# Patient Record
Sex: FEMALE | Race: WHITE | NOT HISPANIC OR LATINO
[De-identification: names, ages, dates, MRNs, and addresses within clinical notes are randomized per-mention and may not be internally consistent; named-entity substitution may affect disease eponyms.]

---

## 2017-08-29 ENCOUNTER — APPOINTMENT (OUTPATIENT)
Dept: OBGYN | Facility: CLINIC | Age: 52
End: 2017-08-29
Payer: COMMERCIAL

## 2017-08-29 VITALS
WEIGHT: 194 LBS | BODY MASS INDEX: 32.32 KG/M2 | HEART RATE: 76 BPM | SYSTOLIC BLOOD PRESSURE: 153 MMHG | DIASTOLIC BLOOD PRESSURE: 89 MMHG | HEIGHT: 65 IN

## 2017-08-29 PROCEDURE — 99396 PREV VISIT EST AGE 40-64: CPT

## 2017-09-09 LAB
CYTOLOGY CVX/VAG DOC THIN PREP: NORMAL
HPV HIGH+LOW RISK DNA PNL CVX: NEGATIVE

## 2017-10-05 ENCOUNTER — RX RENEWAL (OUTPATIENT)
Age: 52
End: 2017-10-05

## 2017-10-06 ENCOUNTER — RX RENEWAL (OUTPATIENT)
Age: 52
End: 2017-10-06

## 2018-09-07 ENCOUNTER — APPOINTMENT (OUTPATIENT)
Dept: OBGYN | Facility: CLINIC | Age: 53
End: 2018-09-07
Payer: COMMERCIAL

## 2018-09-07 VITALS
BODY MASS INDEX: 28.82 KG/M2 | HEIGHT: 65 IN | SYSTOLIC BLOOD PRESSURE: 127 MMHG | DIASTOLIC BLOOD PRESSURE: 86 MMHG | HEART RATE: 52 BPM | WEIGHT: 173 LBS

## 2018-09-07 DIAGNOSIS — Z87.42 PERSONAL HISTORY OF OTHER DISEASES OF THE FEMALE GENITAL TRACT: ICD-10-CM

## 2018-09-07 DIAGNOSIS — N95.1 MENOPAUSAL AND FEMALE CLIMACTERIC STATES: ICD-10-CM

## 2018-09-07 PROCEDURE — 99396 PREV VISIT EST AGE 40-64: CPT

## 2018-09-21 LAB
CYTOLOGY CVX/VAG DOC THIN PREP: NORMAL
HPV HIGH+LOW RISK DNA PNL CVX: NOT DETECTED

## 2018-11-26 ENCOUNTER — MEDICATION RENEWAL (OUTPATIENT)
Age: 53
End: 2018-11-26

## 2018-11-26 ENCOUNTER — OTHER (OUTPATIENT)
Age: 53
End: 2018-11-26

## 2018-11-27 ENCOUNTER — OTHER (OUTPATIENT)
Age: 53
End: 2018-11-27

## 2019-04-26 ENCOUNTER — OUTPATIENT (OUTPATIENT)
Dept: OUTPATIENT SERVICES | Facility: HOSPITAL | Age: 54
LOS: 1 days | End: 2019-04-26
Payer: COMMERCIAL

## 2019-04-26 ENCOUNTER — APPOINTMENT (OUTPATIENT)
Dept: ULTRASOUND IMAGING | Facility: CLINIC | Age: 54
End: 2019-04-26
Payer: COMMERCIAL

## 2019-04-26 DIAGNOSIS — Z00.8 ENCOUNTER FOR OTHER GENERAL EXAMINATION: ICD-10-CM

## 2019-04-26 PROCEDURE — 76882 US LMTD JT/FCL EVL NVASC XTR: CPT

## 2019-04-26 PROCEDURE — 76882 US LMTD JT/FCL EVL NVASC XTR: CPT | Mod: 26,LT

## 2019-08-27 ENCOUNTER — TRANSCRIPTION ENCOUNTER (OUTPATIENT)
Age: 54
End: 2019-08-27

## 2019-10-22 ENCOUNTER — OTHER (OUTPATIENT)
Age: 54
End: 2019-10-22

## 2019-10-31 ENCOUNTER — APPOINTMENT (OUTPATIENT)
Dept: OBGYN | Facility: CLINIC | Age: 54
End: 2019-10-31
Payer: COMMERCIAL

## 2019-10-31 VITALS
HEART RATE: 65 BPM | HEIGHT: 65 IN | WEIGHT: 185 LBS | SYSTOLIC BLOOD PRESSURE: 124 MMHG | BODY MASS INDEX: 30.82 KG/M2 | DIASTOLIC BLOOD PRESSURE: 80 MMHG

## 2019-10-31 DIAGNOSIS — N95.1 MENOPAUSAL AND FEMALE CLIMACTERIC STATES: ICD-10-CM

## 2019-10-31 DIAGNOSIS — Z12.39 ENCOUNTER FOR OTHER SCREENING FOR MALIGNANT NEOPLASM OF BREAST: ICD-10-CM

## 2019-10-31 DIAGNOSIS — Z00.00 ENCOUNTER FOR GENERAL ADULT MEDICAL EXAMINATION W/OUT ABNORMAL FINDINGS: ICD-10-CM

## 2019-10-31 DIAGNOSIS — Z87.898 PERSONAL HISTORY OF OTHER SPECIFIED CONDITIONS: ICD-10-CM

## 2019-10-31 PROCEDURE — 99396 PREV VISIT EST AGE 40-64: CPT

## 2019-10-31 NOTE — HISTORY OF PRESENT ILLNESS
[1 Year Ago] : 1 year ago [Good] : being in good health [Last Mammogram ___] : Last Mammogram was [unfilled] [Last Colonoscopy ___] : Last colonoscopy [unfilled] [Last Pap ___] : Last cervical pap smear was [unfilled] [Perimenopausal] : is perimenopausal [Sexually Active] : is sexually active [Monogamous] : is monogamous [Male ___] : [unfilled] male [NA] : N/A

## 2019-11-08 LAB — HPV HIGH+LOW RISK DNA PNL CVX: NOT DETECTED

## 2019-11-30 PROBLEM — N95.1 MENOPAUSAL SYMPTOMS: Status: RESOLVED | Noted: 2018-09-07 | Resolved: 2019-11-30

## 2019-11-30 NOTE — PHYSICAL EXAM
[Awake] : awake [Alert] : alert [Soft] : soft [Oriented x3] : oriented to person, place, and time [No Lesions] : no genitalia lesions [Normal] : cervix [Pap Obtained] : a Pap smear was performed [Anteversion] : anteverted [Uterine Adnexae] : were not tender and not enlarged [No Tenderness] : no rectal tenderness [Nl Sphincter Tone] : normal sphincter tone [Acute Distress] : no acute distress [LAD] : no lymphadenopathy [Thyroid Nodule] : no thyroid nodule [Goiter] : no goiter [Mass] : no breast mass [Nipple Discharge] : no nipple discharge [Axillary LAD] : no axillary lymphadenopathy [Tender] : non tender [Distended] : not distended [Depressed Mood] : not depressed [Occult Blood] : occult blood test from digital rectal exam was negative [FreeTextEntry4] : slight atrophy [Tenderness] : nontender [FreeTextEntry9] : no masses, no nodules

## 2019-12-05 ENCOUNTER — TRANSCRIPTION ENCOUNTER (OUTPATIENT)
Age: 54
End: 2019-12-05

## 2020-04-01 ENCOUNTER — RX RENEWAL (OUTPATIENT)
Age: 55
End: 2020-04-01

## 2021-05-28 ENCOUNTER — APPOINTMENT (OUTPATIENT)
Dept: OBGYN | Facility: CLINIC | Age: 56
End: 2021-05-28
Payer: COMMERCIAL

## 2021-05-28 VITALS
WEIGHT: 184 LBS | HEART RATE: 77 BPM | DIASTOLIC BLOOD PRESSURE: 82 MMHG | HEIGHT: 65 IN | BODY MASS INDEX: 30.66 KG/M2 | SYSTOLIC BLOOD PRESSURE: 135 MMHG

## 2021-05-28 DIAGNOSIS — Z78.0 ASYMPTOMATIC MENOPAUSAL STATE: ICD-10-CM

## 2021-05-28 PROCEDURE — 99072 ADDL SUPL MATRL&STAF TM PHE: CPT

## 2021-05-28 PROCEDURE — 99396 PREV VISIT EST AGE 40-64: CPT

## 2021-09-20 ENCOUNTER — APPOINTMENT (OUTPATIENT)
Dept: OTOLARYNGOLOGY | Facility: CLINIC | Age: 56
End: 2021-09-20
Payer: COMMERCIAL

## 2021-09-20 VITALS
HEIGHT: 65 IN | TEMPERATURE: 97.4 F | BODY MASS INDEX: 29.99 KG/M2 | HEART RATE: 71 BPM | WEIGHT: 180 LBS | DIASTOLIC BLOOD PRESSURE: 85 MMHG | SYSTOLIC BLOOD PRESSURE: 145 MMHG

## 2021-09-20 DIAGNOSIS — M26.609 UNSPECIFIED TEMPOROMANDIBULAR JOINT DISORDER: ICD-10-CM

## 2021-09-20 DIAGNOSIS — D16.4 BENIGN NEOPLASM OF BONES OF SKULL AND FACE: ICD-10-CM

## 2021-09-20 DIAGNOSIS — K13.70 UNSPECIFIED LESIONS OF ORAL MUCOSA: ICD-10-CM

## 2021-09-20 DIAGNOSIS — H92.02 OTALGIA, LEFT EAR: ICD-10-CM

## 2021-09-20 PROCEDURE — 99203 OFFICE O/P NEW LOW 30 MIN: CPT

## 2021-09-20 RX ORDER — AZELASTINE HYDROCHLORIDE AND FLUTICASONE PROPIONATE 137; 50 UG/1; UG/1
137-50 SPRAY, METERED NASAL
Refills: 0 | Status: ACTIVE | COMMUNITY

## 2021-09-20 NOTE — PHYSICAL EXAM
[Normal] : no abnormal secretions [de-identified] : left ear canal small osteomas [de-identified] : upper lip mucosa mild erythema

## 2021-09-20 NOTE — ASSESSMENT
[FreeTextEntry1] : preauricular pain consistent w tmj\par mild as canal stenosis\par tmj syndrome rec soft diet avoid chewing, advil bid\par return to dentist for adjustment of dental retainer\par mucosal irritation-triamcinalone in orabase

## 2021-09-20 NOTE — HISTORY OF PRESENT ILLNESS
[de-identified] : left ear pain 8 mo ago\par ear exam reported neg\par pain w mastication\par dental exam dx w tmj  has  but still w ear pain\par co inside of lips irritation

## 2022-01-19 ENCOUNTER — APPOINTMENT (OUTPATIENT)
Dept: GASTROENTEROLOGY | Facility: CLINIC | Age: 57
End: 2022-01-19
Payer: COMMERCIAL

## 2022-01-19 VITALS
HEIGHT: 65 IN | WEIGHT: 189 LBS | BODY MASS INDEX: 31.49 KG/M2 | HEART RATE: 86 BPM | OXYGEN SATURATION: 97 % | TEMPERATURE: 97.3 F | SYSTOLIC BLOOD PRESSURE: 135 MMHG | DIASTOLIC BLOOD PRESSURE: 81 MMHG

## 2022-01-19 DIAGNOSIS — Z12.11 ENCOUNTER FOR SCREENING FOR MALIGNANT NEOPLASM OF COLON: ICD-10-CM

## 2022-01-19 PROCEDURE — 99205 OFFICE O/P NEW HI 60 MIN: CPT

## 2022-03-25 ENCOUNTER — NON-APPOINTMENT (OUTPATIENT)
Age: 57
End: 2022-03-25

## 2022-03-29 ENCOUNTER — APPOINTMENT (OUTPATIENT)
Dept: GASTROENTEROLOGY | Facility: HOSPITAL | Age: 57
End: 2022-03-29

## 2022-03-29 ENCOUNTER — RESULT REVIEW (OUTPATIENT)
Age: 57
End: 2022-03-29

## 2022-03-29 ENCOUNTER — OUTPATIENT (OUTPATIENT)
Dept: OUTPATIENT SERVICES | Facility: HOSPITAL | Age: 57
LOS: 1 days | Discharge: HOME | End: 2022-03-29
Payer: COMMERCIAL

## 2022-03-29 VITALS
RESPIRATION RATE: 12 BRPM | DIASTOLIC BLOOD PRESSURE: 73 MMHG | HEART RATE: 87 BPM | HEIGHT: 65 IN | TEMPERATURE: 98 F | WEIGHT: 182.98 LBS | SYSTOLIC BLOOD PRESSURE: 103 MMHG | OXYGEN SATURATION: 98 %

## 2022-03-29 VITALS
OXYGEN SATURATION: 97 % | HEART RATE: 76 BPM | SYSTOLIC BLOOD PRESSURE: 143 MMHG | DIASTOLIC BLOOD PRESSURE: 81 MMHG | RESPIRATION RATE: 18 BRPM

## 2022-03-29 DIAGNOSIS — K29.31: ICD-10-CM

## 2022-03-29 DIAGNOSIS — Z12.11 ENCOUNTER FOR SCREENING FOR MALIGNANT NEOPLASM OF COLON: ICD-10-CM

## 2022-03-29 PROCEDURE — 45378 DIAGNOSTIC COLONOSCOPY: CPT

## 2022-03-29 PROCEDURE — 88305 TISSUE EXAM BY PATHOLOGIST: CPT | Mod: 26

## 2022-03-29 PROCEDURE — 43239 EGD BIOPSY SINGLE/MULTIPLE: CPT

## 2022-03-29 RX ORDER — SODIUM PICOSULFATE, MAGNESIUM OXIDE, AND ANHYDROUS CITRIC ACID 10; 3.5; 12 MG/160ML; G/160ML; G/160ML
10-3.5-12 MG-GM LIQUID ORAL
Qty: 1 | Refills: 0 | Status: COMPLETED | COMMUNITY
Start: 2022-01-19 | End: 2022-03-29

## 2022-04-01 LAB — SURGICAL PATHOLOGY STUDY: SIGNIFICANT CHANGE UP

## 2022-05-02 ENCOUNTER — APPOINTMENT (OUTPATIENT)
Dept: GASTROENTEROLOGY | Facility: CLINIC | Age: 57
End: 2022-05-02
Payer: COMMERCIAL

## 2022-05-02 VITALS
SYSTOLIC BLOOD PRESSURE: 136 MMHG | OXYGEN SATURATION: 98 % | HEART RATE: 106 BPM | WEIGHT: 185 LBS | HEIGHT: 65 IN | BODY MASS INDEX: 30.82 KG/M2 | DIASTOLIC BLOOD PRESSURE: 74 MMHG

## 2022-05-02 DIAGNOSIS — R14.0 ABDOMINAL DISTENSION (GASEOUS): ICD-10-CM

## 2022-05-02 DIAGNOSIS — R19.4 CHANGE IN BOWEL HABIT: ICD-10-CM

## 2022-05-02 PROCEDURE — 99215 OFFICE O/P EST HI 40 MIN: CPT

## 2022-06-03 ENCOUNTER — NON-APPOINTMENT (OUTPATIENT)
Age: 57
End: 2022-06-03

## 2022-06-13 ENCOUNTER — NON-APPOINTMENT (OUTPATIENT)
Age: 57
End: 2022-06-13

## 2022-06-13 ENCOUNTER — APPOINTMENT (OUTPATIENT)
Dept: OBGYN | Facility: CLINIC | Age: 57
End: 2022-06-13

## 2022-06-15 ENCOUNTER — NON-APPOINTMENT (OUTPATIENT)
Age: 57
End: 2022-06-15

## 2022-07-12 ENCOUNTER — APPOINTMENT (OUTPATIENT)
Dept: OBGYN | Facility: CLINIC | Age: 57
End: 2022-07-12

## 2022-07-12 DIAGNOSIS — B00.1 HERPESVIRAL VESICULAR DERMATITIS: ICD-10-CM

## 2022-07-12 PROCEDURE — 99213 OFFICE O/P EST LOW 20 MIN: CPT | Mod: 95

## 2022-07-12 RX ORDER — LEVOCETIRIZINE DIHYDROCHLORIDE 5 MG
5 TABLET ORAL
Qty: 30 | Refills: 0 | Status: DISCONTINUED | COMMUNITY
Start: 2021-11-14

## 2022-07-12 RX ORDER — ASCORBIC ACID 1000 MG
10 TABLET ORAL
Refills: 0 | Status: ACTIVE | COMMUNITY

## 2022-07-12 RX ORDER — CHROMIUM 200 MCG
TABLET ORAL
Refills: 0 | Status: ACTIVE | COMMUNITY

## 2022-07-12 RX ORDER — TRIAMCINOLONE ACETONIDE 1 MG/G
0.1 PASTE DENTAL 3 TIMES DAILY
Qty: 1 | Refills: 4 | Status: DISCONTINUED | COMMUNITY
Start: 2021-09-20 | End: 2022-07-12

## 2022-07-12 RX ORDER — VALSARTAN 320 MG/1
320 TABLET, COATED ORAL
Qty: 90 | Refills: 0 | Status: ACTIVE | COMMUNITY
Start: 2021-10-08

## 2022-07-29 ENCOUNTER — APPOINTMENT (OUTPATIENT)
Dept: OBGYN | Facility: CLINIC | Age: 57
End: 2022-07-29

## 2022-07-29 VITALS
WEIGHT: 187 LBS | HEART RATE: 61 BPM | BODY MASS INDEX: 31.16 KG/M2 | HEIGHT: 65 IN | DIASTOLIC BLOOD PRESSURE: 86 MMHG | SYSTOLIC BLOOD PRESSURE: 147 MMHG

## 2022-07-29 PROCEDURE — 99396 PREV VISIT EST AGE 40-64: CPT

## 2022-08-11 ENCOUNTER — RX RENEWAL (OUTPATIENT)
Age: 57
End: 2022-08-11

## 2022-09-19 ENCOUNTER — APPOINTMENT (OUTPATIENT)
Dept: GASTROENTEROLOGY | Facility: CLINIC | Age: 57
End: 2022-09-19

## 2022-10-06 ENCOUNTER — NON-APPOINTMENT (OUTPATIENT)
Age: 57
End: 2022-10-06

## 2022-10-12 ENCOUNTER — NON-APPOINTMENT (OUTPATIENT)
Age: 57
End: 2022-10-12

## 2022-10-12 LAB — SARS-COV-2 N GENE NPH QL NAA+PROBE: NOT DETECTED

## 2022-10-14 ENCOUNTER — APPOINTMENT (OUTPATIENT)
Dept: GASTROENTEROLOGY | Facility: CLINIC | Age: 57
End: 2022-10-14

## 2022-10-14 PROCEDURE — 91065 BREATH HYDROGEN/METHANE TEST: CPT

## 2022-10-18 ENCOUNTER — TRANSCRIPTION ENCOUNTER (OUTPATIENT)
Age: 57
End: 2022-10-18

## 2022-10-18 ENCOUNTER — NON-APPOINTMENT (OUTPATIENT)
Age: 57
End: 2022-10-18

## 2022-10-19 DIAGNOSIS — Z01.818 ENCOUNTER FOR OTHER PREPROCEDURAL EXAMINATION: ICD-10-CM

## 2023-01-11 DIAGNOSIS — R39.9 UNSPECIFIED SYMPTOMS AND SIGNS INVOLVING THE GENITOURINARY SYSTEM: ICD-10-CM

## 2023-01-13 ENCOUNTER — NON-APPOINTMENT (OUTPATIENT)
Age: 58
End: 2023-01-13

## 2023-01-13 LAB
APPEARANCE: CLEAR
BACTERIA: NEGATIVE
BILIRUBIN URINE: NEGATIVE
BLOOD URINE: NEGATIVE
COLOR: NORMAL
GLUCOSE QUALITATIVE U: NEGATIVE
HYALINE CASTS: 0 /LPF
KETONES URINE: NEGATIVE
LEUKOCYTE ESTERASE URINE: ABNORMAL
MICROSCOPIC-UA: NORMAL
NITRITE URINE: NEGATIVE
PH URINE: 7
PROTEIN URINE: NEGATIVE
RED BLOOD CELLS URINE: 1 /HPF
SPECIFIC GRAVITY URINE: 1.01
SQUAMOUS EPITHELIAL CELLS: 1 /HPF
UROBILINOGEN URINE: NORMAL
WHITE BLOOD CELLS URINE: 8 /HPF

## 2023-01-13 RX ORDER — NITROFURANTOIN (MONOHYDRATE/MACROCRYSTALS) 25; 75 MG/1; MG/1
100 CAPSULE ORAL
Qty: 10 | Refills: 0 | Status: ACTIVE | COMMUNITY
Start: 2023-01-13 | End: 1900-01-01

## 2023-01-14 LAB — BACTERIA UR CULT: ABNORMAL

## 2023-01-20 ENCOUNTER — APPOINTMENT (OUTPATIENT)
Dept: GASTROENTEROLOGY | Facility: CLINIC | Age: 58
End: 2023-01-20

## 2023-06-16 ENCOUNTER — NON-APPOINTMENT (OUTPATIENT)
Age: 58
End: 2023-06-16

## 2023-06-29 ENCOUNTER — TRANSCRIPTION ENCOUNTER (OUTPATIENT)
Age: 58
End: 2023-06-29

## 2023-06-29 RX ORDER — OMEPRAZOLE 40 MG/1
40 CAPSULE, DELAYED RELEASE ORAL
Qty: 30 | Refills: 3 | Status: ACTIVE | COMMUNITY
Start: 2022-03-29 | End: 1900-01-01

## 2023-07-31 ENCOUNTER — APPOINTMENT (OUTPATIENT)
Dept: OBGYN | Facility: CLINIC | Age: 58
End: 2023-07-31
Payer: COMMERCIAL

## 2023-07-31 VITALS
SYSTOLIC BLOOD PRESSURE: 137 MMHG | WEIGHT: 193.4 LBS | HEART RATE: 65 BPM | BODY MASS INDEX: 32.22 KG/M2 | DIASTOLIC BLOOD PRESSURE: 83 MMHG | HEIGHT: 65 IN

## 2023-07-31 DIAGNOSIS — R92.2 INCONCLUSIVE MAMMOGRAM: ICD-10-CM

## 2023-07-31 DIAGNOSIS — Z01.419 ENCOUNTER FOR GYNECOLOGICAL EXAMINATION (GENERAL) (ROUTINE) W/OUT ABNORMAL FINDINGS: ICD-10-CM

## 2023-07-31 PROCEDURE — 99396 PREV VISIT EST AGE 40-64: CPT

## 2023-07-31 NOTE — SIGNATURES
[TextEntry] : This note was written by Brionna Napoles on 07/31/2023 actively solely Ca Lopes M.D.   All medical record entries made by this scribe at my, Ca Lopes M.D. direction and personally dictated by me on 07/31/2023. I have personally reviewed the chart and agree that the record reflects my personal performance of the history, physical exam, assessment, and plan.

## 2023-07-31 NOTE — PLAN
[FreeTextEntry1] : - Referral for mammo and breast U/S given today due to dense breasts. - Referral for bone density given today. - Pap/HPV done today. - RTO in 1 year for annual GYN visit

## 2023-07-31 NOTE — PHYSICAL EXAM
[Appropriately responsive] : appropriately responsive [Alert] : alert [No Acute Distress] : no acute distress [No Lymphadenopathy] : no lymphadenopathy [Regular Rate Rhythm] : regular rate rhythm [No Murmurs] : no murmurs [Clear to Auscultation B/L] : clear to auscultation bilaterally [Soft] : soft [Non-tender] : non-tender [Non-distended] : non-distended [No HSM] : No HSM [No Lesions] : no lesions [No Mass] : no mass [Oriented x3] : oriented x3 [Examination Of The Breasts] : a normal appearance [No Discharge] : no discharge [No Masses] : no breast masses were palpable [Labia Majora] : normal [Labia Minora] : normal [Normal] : normal [No Tenderness] : no tenderness [Nl Sphincter Tone] : normal sphincter tone [Retroversion] : retroverted [Uterine Adnexae] : non-palpable [FreeTextEntry5] : Pap/HPV obtained. [FreeTextEntry9] : Confirms vaginal exam.

## 2023-07-31 NOTE — HISTORY OF PRESENT ILLNESS
[LMP unknown] : LMP unknown [postmenopausal] : postmenopausal [N] : Patient does not use contraception [Y] : Patient is sexually active [Monogamous (Male Partner)] : is monogamous with a male partner [unknown] : Patient is unsure of the date of her LMP [Post-Menopause, No Sxs] : post-menopausal, currently without symptoms [Currently Active] : currently active [Men] : men [No] : No [FreeTextEntry1] : Pt is a 58 year old postmenopausal female, who presents for an annual GYN visit. Pt reports vasomotor sx, but denies vaginal dryness. She states she does self-breast exams.  [Mammogramdate] : 02/2023 [TextBox_19] : Birads 1 [PapSmeardate] : 10/2019 [BoneDensityDate] : 12/2021 [ColonoscopyDate] : 03/2022

## 2023-08-11 LAB
CYTOLOGY CVX/VAG DOC THIN PREP: ABNORMAL
HPV HIGH+LOW RISK DNA PNL CVX: NOT DETECTED

## 2024-01-01 NOTE — ASU PREOP CHECKLIST - ANTIBIOTIC
n/a For information on Fall & Injury Prevention, visit: [email or web address] OR  [email or web address] OR  https://www.cdc.gov/steadi/patient.html

## 2024-04-25 RX ORDER — VALACYCLOVIR 1 G/1
1 TABLET, FILM COATED ORAL TWICE DAILY
Qty: 30 | Refills: 2 | Status: ACTIVE | COMMUNITY
Start: 2022-07-12 | End: 1900-01-01

## 2024-06-29 ENCOUNTER — NON-APPOINTMENT (OUTPATIENT)
Age: 59
End: 2024-06-29

## 2024-08-05 ENCOUNTER — APPOINTMENT (OUTPATIENT)
Dept: OBGYN | Facility: CLINIC | Age: 59
End: 2024-08-05

## 2024-08-05 PROCEDURE — 99396 PREV VISIT EST AGE 40-64: CPT

## 2024-08-05 NOTE — PLAN
[FreeTextEntry1] : - Extensively discussed risks, benefits, and alternatives of hormone replacement therapy. All of pt's questions were answered to her apparent satisfaction. - Referrals for mammo/sono and bone density given today. - RTO 1 year for annual gyn exam.

## 2024-08-05 NOTE — DISCUSSION/SUMMARY
[FreeTextEntry1] :  This note was written by Ragini Lozada on 08/05/2024 actively solely Dr. Ca Ferguson M.D.   All medical record entries made by this scribe at my, Dr. Ca Ferguson M.D. direction and personally dictated by me on 08/05/2024. I have personally reviewed the chart and agree that the record reflects my personal performance of the history, physical exam, assessment, and plan.

## 2024-08-05 NOTE — HISTORY OF PRESENT ILLNESS
[unknown] : Patient is unsure of the date of her LMP [Post-Menopause, No Sxs] : post-menopausal, currently without symptoms [Currently Active] : currently active [Men] : men [No] : No [FreeTextEntry1] : 59 year old postmenopausal female presents for annual gyn visit. Pt is doing well and has no complaints. Pt reports she has been menopausal since 2019. Denies vaginal dryness and reports occasional hot flashes that do not bother her. She denies PMB and abnormal discharge. Pt reports doing her monthly breast exams.   Pt h/o plantar fasciitis and goes to PT. Reports difficulty losing weight. She exercises, bikes, and swims often. Advised to limit carb intake and control her diet.   [Mammogramdate] : 05/24 [BreastSonogramDate] : 05/24 [PapSmeardate] : 07/23 [BoneDensityDate] : 12/21 [ColonoscopyDate] : 03/22

## 2024-08-05 NOTE — PHYSICAL EXAM
[Chaperone Present] : A chaperone was present in the examining room during all aspects of the physical examination [Appropriately responsive] : appropriately responsive [Alert] : alert [No Acute Distress] : no acute distress [No Lymphadenopathy] : no lymphadenopathy [Regular Rate Rhythm] : regular rate rhythm [No Murmurs] : no murmurs [Clear to Auscultation B/L] : clear to auscultation bilaterally [Soft] : soft [Non-tender] : non-tender [Non-distended] : non-distended [No HSM] : No HSM [No Lesions] : no lesions [No Mass] : no mass [Oriented x3] : oriented x3 [Examination Of The Breasts] : a normal appearance [No Discharge] : no discharge [No Masses] : no breast masses were palpable [Labia Majora] : normal [Labia Minora] : normal [Normal] : normal [Uterine Adnexae] : normal [No Tenderness] : no tenderness [Nl Sphincter Tone] : normal sphincter tone [Anteversion] : anteverted [FreeTextEntry2] : Mary [Tenderness] : nontender [Enlarged ___ wks] : not enlarged [Mass ___ cm] : no uterine mass was palpated [FreeTextEntry9] : Confirmed vaginal exam

## 2024-09-02 PROBLEM — Z71.89 COUNSELING FOR HORMONE REPLACEMENT THERAPY: Status: ACTIVE | Noted: 2024-09-02

## 2024-10-15 ENCOUNTER — APPOINTMENT (OUTPATIENT)
Dept: OTOLARYNGOLOGY | Facility: CLINIC | Age: 59
End: 2024-10-15
Payer: COMMERCIAL

## 2024-10-15 VITALS
BODY MASS INDEX: 31.65 KG/M2 | WEIGHT: 190 LBS | SYSTOLIC BLOOD PRESSURE: 158 MMHG | DIASTOLIC BLOOD PRESSURE: 95 MMHG | HEIGHT: 65 IN | HEART RATE: 92 BPM

## 2024-10-15 DIAGNOSIS — J34.89 OTHER SPECIFIED DISORDERS OF NOSE AND NASAL SINUSES: ICD-10-CM

## 2024-10-15 PROCEDURE — 99203 OFFICE O/P NEW LOW 30 MIN: CPT

## 2024-10-15 RX ORDER — FEXOFENADINE HCL 60 MG
CAPSULE ORAL
Refills: 0 | Status: ACTIVE | COMMUNITY

## 2024-10-15 RX ORDER — CHLORHEXIDINE GLUCONATE 4 %
LIQUID (ML) TOPICAL
Refills: 0 | Status: ACTIVE | COMMUNITY

## 2024-10-15 RX ORDER — PSYLLIUM HUSK 0.4 G
CAPSULE ORAL
Refills: 0 | Status: ACTIVE | COMMUNITY

## 2024-10-15 RX ORDER — ROSUVASTATIN CALCIUM 5 MG/1
TABLET, FILM COATED ORAL
Refills: 0 | Status: ACTIVE | COMMUNITY

## 2025-06-04 ENCOUNTER — TRANSCRIPTION ENCOUNTER (OUTPATIENT)
Age: 60
End: 2025-06-04

## 2025-07-16 ENCOUNTER — TRANSCRIPTION ENCOUNTER (OUTPATIENT)
Age: 60
End: 2025-07-16

## 2025-07-17 ENCOUNTER — TRANSCRIPTION ENCOUNTER (OUTPATIENT)
Age: 60
End: 2025-07-17

## 2025-08-08 ENCOUNTER — APPOINTMENT (OUTPATIENT)
Dept: OBGYN | Facility: CLINIC | Age: 60
End: 2025-08-08

## 2025-08-12 ENCOUNTER — APPOINTMENT (OUTPATIENT)
Dept: OBGYN | Facility: CLINIC | Age: 60
End: 2025-08-12

## (undated) DEVICE — BIOPSY FORCEP COLD DISP

## (undated) DEVICE — BITE BLOCK ADULT 20 X 27MM (GREEN)

## (undated) DEVICE — TUBING SUCTION NONCONDUCTIVE 6MM X 12FT

## (undated) DEVICE — TUBING MEDI-VAC W MAXIGRIP CONNECTORS 1/4"X6'

## (undated) DEVICE — BASIN EMESIS 10IN GRADUATED MAUVE

## (undated) DEVICE — PACK IV START WITH CHG

## (undated) DEVICE — DRSG 2X2

## (undated) DEVICE — CATH IV SAFE BC 22G X 1" (BLUE)

## (undated) DEVICE — BIOPSY FORCEP RADIAL JAW 4 STANDARD WITH NEEDLE

## (undated) DEVICE — GOWN LG

## (undated) DEVICE — SALIVA EJECTOR (BLUE)

## (undated) DEVICE — LINE BREATHE SAMPLNG

## (undated) DEVICE — UNDERPAD LINEN SAVER 17 X 24"

## (undated) DEVICE — ELCTR ECG CONDUCTIVE ADHESIVE

## (undated) DEVICE — CONTAINER FORMALIN 80ML YELLOW

## (undated) DEVICE — DENTURE CUP PINK

## (undated) DEVICE — CLAMP BX HOT RAD JAW 3

## (undated) DEVICE — DRSG CURITY GAUZE SPONGE 4 X 4" 12-PLY NON-STERILE

## (undated) DEVICE — FACESHIELD FULL VISOR

## (undated) DEVICE — DRSG BANDAID 0.75X3"

## (undated) DEVICE — TUBING IV SET GRAVITY 3Y 100" MACRO

## (undated) DEVICE — CONTAINER FORMALIN 10% 20ML

## (undated) DEVICE — ELCTR GROUNDING PAD ADULT COVIDIEN

## (undated) DEVICE — LUBRICATING JELLY HR ONE SHOT 3G